# Patient Record
Sex: FEMALE | Race: WHITE | HISPANIC OR LATINO | URBAN - METROPOLITAN AREA
[De-identification: names, ages, dates, MRNs, and addresses within clinical notes are randomized per-mention and may not be internally consistent; named-entity substitution may affect disease eponyms.]

---

## 2023-10-18 ENCOUNTER — APPOINTMENT (RX ONLY)
Dept: URBAN - METROPOLITAN AREA CLINIC 352 | Facility: CLINIC | Age: 43
Setting detail: DERMATOLOGY
End: 2023-10-18

## 2023-10-18 DIAGNOSIS — B35.4 TINEA CORPORIS: ICD-10-CM

## 2023-10-18 DIAGNOSIS — L23.9 ALLERGIC CONTACT DERMATITIS, UNSPECIFIED CAUSE: ICD-10-CM

## 2023-10-18 PROCEDURE — ? PRESCRIPTION MEDICATION MANAGEMENT

## 2023-10-18 PROCEDURE — 99204 OFFICE O/P NEW MOD 45 MIN: CPT

## 2023-10-18 PROCEDURE — ? PRESCRIPTION

## 2023-10-18 PROCEDURE — ? COUNSELING

## 2023-10-18 PROCEDURE — ? ADDITIONAL NOTES

## 2023-10-18 RX ORDER — KETOCONAZOLE 20 MG/G
CREAM TOPICAL BID
Qty: 60 | Refills: 4 | Status: ERX | COMMUNITY
Start: 2023-10-18

## 2023-10-18 RX ADMIN — KETOCONAZOLE: 20 CREAM TOPICAL at 00:00

## 2023-10-18 ASSESSMENT — LOCATION ZONE DERM: LOCATION ZONE: TRUNK

## 2023-10-18 ASSESSMENT — LOCATION SIMPLE DESCRIPTION DERM: LOCATION SIMPLE: ABDOMEN

## 2023-10-18 ASSESSMENT — LOCATION DETAILED DESCRIPTION DERM: LOCATION DETAILED: RIGHT RIB CAGE

## 2023-10-18 NOTE — PROCEDURE: PRESCRIPTION MEDICATION MANAGEMENT
Detail Level: Zone
Initiate Treatment: ketoconazole 2 % topical cream BID
Discontinue Regimen: Pt advised to stop applying essential oils
Render In Strict Bullet Format?: No

## 2023-10-18 NOTE — PROCEDURE: ADDITIONAL NOTES
Additional Notes: Consider biopsy if fails to resolve
Render Risk Assessment In Note?: no
Detail Level: Simple
Additional Notes: Patient has been applying essential oils on the rash , appears to be a secondary contact dermatitis. Recommended to stop applying essential oils